# Patient Record
Sex: FEMALE | Race: WHITE | NOT HISPANIC OR LATINO | Employment: FULL TIME | URBAN - METROPOLITAN AREA
[De-identification: names, ages, dates, MRNs, and addresses within clinical notes are randomized per-mention and may not be internally consistent; named-entity substitution may affect disease eponyms.]

---

## 2020-11-03 ENCOUNTER — APPOINTMENT (EMERGENCY)
Dept: RADIOLOGY | Facility: HOSPITAL | Age: 30
End: 2020-11-03
Payer: COMMERCIAL

## 2020-11-03 ENCOUNTER — HOSPITAL ENCOUNTER (EMERGENCY)
Facility: HOSPITAL | Age: 30
Discharge: HOME | End: 2020-11-03
Attending: FAMILY MEDICINE
Payer: COMMERCIAL

## 2020-11-03 VITALS
BODY MASS INDEX: 39.24 KG/M2 | HEART RATE: 85 BPM | HEIGHT: 67 IN | RESPIRATION RATE: 20 BRPM | WEIGHT: 250 LBS | TEMPERATURE: 97.9 F | SYSTOLIC BLOOD PRESSURE: 115 MMHG | DIASTOLIC BLOOD PRESSURE: 61 MMHG | OXYGEN SATURATION: 97 %

## 2020-11-03 DIAGNOSIS — R07.89 ATYPICAL CHEST PAIN: Primary | ICD-10-CM

## 2020-11-03 DIAGNOSIS — F41.9 ANXIETY: ICD-10-CM

## 2020-11-03 LAB
ANION GAP SERPL CALCULATED.3IONS-SCNC: 8 MMOL/L (ref 4–13)
BASOPHILS # BLD AUTO: 0.1 THOUSANDS/ΜL (ref 0–0.1)
BASOPHILS NFR BLD AUTO: 1 % (ref 0–2)
BNP SERPL-MCNC: 10 PG/ML (ref 1–100)
BUN SERPL-MCNC: 16 MG/DL (ref 7–25)
CALCIUM SERPL-MCNC: 10 MG/DL (ref 8.6–10.5)
CHLORIDE SERPL-SCNC: 104 MMOL/L (ref 98–107)
CO2 SERPL-SCNC: 25 MMOL/L (ref 21–31)
CREAT SERPL-MCNC: 0.83 MG/DL (ref 0.6–1.2)
EOSINOPHIL # BLD AUTO: 0.2 THOUSAND/ΜL (ref 0–0.61)
EOSINOPHIL NFR BLD AUTO: 2 % (ref 0–5)
ERYTHROCYTE [DISTWIDTH] IN BLOOD BY AUTOMATED COUNT: 13 % (ref 11.5–14.5)
GFR SERPL CREATININE-BSD FRML MDRD: 96 ML/MIN/1.73SQ M
GLUCOSE SERPL-MCNC: 114 MG/DL (ref 65–99)
HCT VFR BLD AUTO: 45 % (ref 42–47)
HGB BLD-MCNC: 15.4 G/DL (ref 12–16)
LYMPHOCYTES # BLD AUTO: 2.7 THOUSANDS/ΜL (ref 0.6–4.47)
LYMPHOCYTES NFR BLD AUTO: 36 % (ref 21–51)
MCH RBC QN AUTO: 30.1 PG (ref 26–34)
MCHC RBC AUTO-ENTMCNC: 34.2 G/DL (ref 31–37)
MCV RBC AUTO: 88 FL (ref 81–99)
MONOCYTES # BLD AUTO: 0.6 THOUSAND/ΜL (ref 0.17–1.22)
MONOCYTES NFR BLD AUTO: 8 % (ref 2–12)
NEUTROPHILS # BLD AUTO: 4 THOUSANDS/ΜL (ref 1.4–6.5)
NEUTS SEG NFR BLD AUTO: 53 % (ref 42–75)
PLATELET # BLD AUTO: 343 THOUSANDS/UL (ref 149–390)
PMV BLD AUTO: 8.7 FL (ref 8.6–11.7)
POTASSIUM SERPL-SCNC: 4 MMOL/L (ref 3.5–5.5)
RBC # BLD AUTO: 5.11 MILLION/UL (ref 3.9–5.2)
SODIUM SERPL-SCNC: 137 MMOL/L (ref 134–143)
TROPONIN I SERPL-MCNC: <0.03 NG/ML
WBC # BLD AUTO: 7.4 THOUSAND/UL (ref 4.8–10.8)

## 2020-11-03 PROCEDURE — 99285 EMERGENCY DEPT VISIT HI MDM: CPT

## 2020-11-03 PROCEDURE — 84484 ASSAY OF TROPONIN QUANT: CPT | Performed by: FAMILY MEDICINE

## 2020-11-03 PROCEDURE — 85025 COMPLETE CBC W/AUTO DIFF WBC: CPT | Performed by: FAMILY MEDICINE

## 2020-11-03 PROCEDURE — 71045 X-RAY EXAM CHEST 1 VIEW: CPT

## 2020-11-03 PROCEDURE — 36415 COLL VENOUS BLD VENIPUNCTURE: CPT | Performed by: FAMILY MEDICINE

## 2020-11-03 PROCEDURE — 80048 BASIC METABOLIC PNL TOTAL CA: CPT | Performed by: FAMILY MEDICINE

## 2020-11-03 PROCEDURE — 99284 EMERGENCY DEPT VISIT MOD MDM: CPT | Performed by: FAMILY MEDICINE

## 2020-11-03 PROCEDURE — 83880 ASSAY OF NATRIURETIC PEPTIDE: CPT | Performed by: FAMILY MEDICINE

## 2020-11-03 PROCEDURE — 93005 ELECTROCARDIOGRAM TRACING: CPT

## 2020-11-03 RX ORDER — IBUPROFEN 200 MG
400 TABLET ORAL EVERY 6 HOURS PRN
COMMUNITY

## 2020-11-03 RX ORDER — LORAZEPAM 1 MG/1
1 TABLET ORAL ONCE
Status: COMPLETED | OUTPATIENT
Start: 2020-11-03 | End: 2020-11-03

## 2020-11-03 RX ORDER — BUSPIRONE HYDROCHLORIDE 15 MG/1
15 TABLET ORAL 3 TIMES DAILY
Qty: 60 TABLET | Refills: 0 | Status: SHIPPED | OUTPATIENT
Start: 2020-11-03 | End: 2020-11-23

## 2020-11-03 RX ADMIN — LORAZEPAM 1 MG: 1 TABLET ORAL at 10:45

## 2020-11-04 LAB
ATRIAL RATE: 97 BPM
P AXIS: 69 DEGREES
PR INTERVAL: 150 MS
QRS AXIS: 79 DEGREES
QRSD INTERVAL: 80 MS
QT INTERVAL: 358 MS
QTC INTERVAL: 454 MS
T WAVE AXIS: 34 DEGREES
VENTRICULAR RATE: 97 BPM

## 2020-11-04 PROCEDURE — 93010 ELECTROCARDIOGRAM REPORT: CPT | Performed by: INTERNAL MEDICINE

## 2021-03-30 DIAGNOSIS — Z23 ENCOUNTER FOR IMMUNIZATION: ICD-10-CM

## 2022-04-15 ENCOUNTER — OFFICE VISIT (OUTPATIENT)
Dept: URGENT CARE | Facility: CLINIC | Age: 32
End: 2022-04-15
Payer: COMMERCIAL

## 2022-04-15 VITALS
OXYGEN SATURATION: 98 % | HEIGHT: 67 IN | HEART RATE: 110 BPM | TEMPERATURE: 97.3 F | WEIGHT: 265 LBS | RESPIRATION RATE: 18 BRPM | BODY MASS INDEX: 41.59 KG/M2

## 2022-04-15 DIAGNOSIS — R05.1 ACUTE COUGH: Primary | ICD-10-CM

## 2022-04-15 PROCEDURE — 87636 SARSCOV2 & INF A&B AMP PRB: CPT | Performed by: NURSE PRACTITIONER

## 2022-04-15 PROCEDURE — G0382 LEV 3 HOSP TYPE B ED VISIT: HCPCS | Performed by: NURSE PRACTITIONER

## 2022-04-15 RX ORDER — BROMPHENIRAMINE MALEATE, PSEUDOEPHEDRINE HYDROCHLORIDE, AND DEXTROMETHORPHAN HYDROBROMIDE 2; 30; 10 MG/5ML; MG/5ML; MG/5ML
5 SYRUP ORAL 4 TIMES DAILY PRN
Qty: 120 ML | Refills: 0 | Status: SHIPPED | OUTPATIENT
Start: 2022-04-15

## 2022-04-15 RX ORDER — ALBUTEROL SULFATE 90 UG/1
2 AEROSOL, METERED RESPIRATORY (INHALATION) EVERY 6 HOURS PRN
Qty: 6.7 G | Refills: 0 | Status: SHIPPED | OUTPATIENT
Start: 2022-04-15

## 2022-04-15 NOTE — PATIENT INSTRUCTIONS
Take medication as directed  Rest and drink plenty of fluids  A cool mist humidifier can be helpful  If you develop a worsening cough, chest pain, shortness of breath, palpitations, coughing up blood, prolonged high fever, any new or concerning symptoms please return or proceed ER  Recommend following up with PCP in 3-5 days  Acute Cough   WHAT YOU NEED TO KNOW:   An acute cough can last up to 3 weeks  Common causes of an acute cough include a cold, allergies, or a lung infection  DISCHARGE INSTRUCTIONS:   Return to the emergency department if:   · You have trouble breathing or feel short of breath  · You cough up blood, or you see blood in your mucus  · You faint or feel weak or dizzy  · You have chest pain when you cough or take a deep breath  · You have new wheezing  Contact your healthcare provider if:   · You have a fever  · Your cough lasts longer than 4 weeks  · Your symptoms do not improve with treatment  · You have questions or concerns about your condition or care  Medicines:   · Medicines  may be needed to stop the cough, decrease swelling in your airways, or help open your airways  Medicine may also be given to help you cough up mucus  Ask your healthcare provider what over-the-counter medicines you can take  If you have an infection caused by bacteria, you may need antibiotics  · Take your medicine as directed  Contact your healthcare provider if you think your medicine is not helping or if you have side effects  Tell him or her if you are allergic to any medicine  Keep a list of the medicines, vitamins, and herbs you take  Include the amounts, and when and why you take them  Bring the list or the pill bottles to follow-up visits  Carry your medicine list with you in case of an emergency  Manage your symptoms:   · Do not smoke and stay away from others who smoke    Nicotine and other chemicals in cigarettes and cigars can cause lung damage and make your cough worse  Ask your healthcare provider for information if you currently smoke and need help to quit  E-cigarettes or smokeless tobacco still contain nicotine  Talk to your healthcare provider before you use these products  · Drink extra liquids as directed  Liquids will help thin and loosen mucus so you can cough it up  Liquids will also help prevent dehydration  Examples of good liquids to drink include water, fruit juice, and broth  Do not drink liquids that contain caffeine  Caffeine can increase your risk for dehydration  Ask your healthcare provider how much liquid to drink each day  · Rest as directed  Do not do activities that make your cough worse, such as exercise  · Use a humidifier or vaporizer  Use a cool mist humidifier or a vaporizer to increase air moisture in your home  This may make it easier for you to breathe and help decrease your cough  · Eat 2 to 5 mL of honey 2 times each day  Honey can help thin mucus and decrease your cough  · Use cough drops or lozenges  These can help decrease throat irritation and your cough  Follow up with your healthcare provider as directed:  Write down your questions so you remember to ask them during your visits  © Copyright [employer] 2022 Information is for End User's use only and may not be sold, redistributed or otherwise used for commercial purposes  All illustrations and images included in CareNotes® are the copyrighted property of A D A M , Inc  or [facility] Alysha Kaur   The above information is an  only  It is not intended as medical advice for individual conditions or treatments  Talk to your doctor, nurse or pharmacist before following any medical regimen to see if it is safe and effective for you

## 2022-04-15 NOTE — PROGRESS NOTES
[facility]        NAME: Irma Nobles is a 28 y o  female  : 1990    MRN: [id number]  DATE: April 15, 2022  TIME: 6:24 PM    Assessment and Plan   Acute cough [R05 1]  1  Acute cough  Covid/Flu-Office Collect    albuterol (Proventil HFA) 90 mcg/act inhaler    brompheniramine-pseudoephedrine-DM 30-2-10 MG/5ML syrup         Patient Instructions     Patient Instructions   Take medication as directed  Rest and drink plenty of fluids  A cool mist humidifier can be helpful  If you develop a worsening cough, chest pain, shortness of breath, palpitations, coughing up blood, prolonged high fever, any new or concerning symptoms please return or proceed ER  Recommend following up with PCP in 3-5 days  Acute Cough   WHAT YOU NEED TO KNOW:   An acute cough can last up to 3 weeks  Common causes of an acute cough include a cold, allergies, or a lung infection  DISCHARGE INSTRUCTIONS:   Return to the emergency department if:   · You have trouble breathing or feel short of breath  · You cough up blood, or you see blood in your mucus  · You faint or feel weak or dizzy  · You have chest pain when you cough or take a deep breath  · You have new wheezing  Contact your healthcare provider if:   · You have a fever  · Your cough lasts longer than 4 weeks  · Your symptoms do not improve with treatment  · You have questions or concerns about your condition or care  Medicines:   · Medicines  may be needed to stop the cough, decrease swelling in your airways, or help open your airways  Medicine may also be given to help you cough up mucus  Ask your healthcare provider what over-the-counter medicines you can take  If you have an infection caused by bacteria, you may need antibiotics  · Take your medicine as directed  Contact your healthcare provider if you think your medicine is not helping or if you have side effects  Tell him or her if you are allergic to any medicine   Keep a list of the medicines, vitamins, and herbs you take  Include the amounts, and when and why you take them  Bring the list or the pill bottles to follow-up visits  Carry your medicine list with you in case of an emergency  Manage your symptoms:   · Do not smoke and stay away from others who smoke  Nicotine and other chemicals in cigarettes and cigars can cause lung damage and make your cough worse  Ask your healthcare provider for information if you currently smoke and need help to quit  E-cigarettes or smokeless tobacco still contain nicotine  Talk to your healthcare provider before you use these products  · Drink extra liquids as directed  Liquids will help thin and loosen mucus so you can cough it up  Liquids will also help prevent dehydration  Examples of good liquids to drink include water, fruit juice, and broth  Do not drink liquids that contain caffeine  Caffeine can increase your risk for dehydration  Ask your healthcare provider how much liquid to drink each day  · Rest as directed  Do not do activities that make your cough worse, such as exercise  · Use a humidifier or vaporizer  Use a cool mist humidifier or a vaporizer to increase air moisture in your home  This may make it easier for you to breathe and help decrease your cough  · Eat 2 to 5 mL of honey 2 times each day  Honey can help thin mucus and decrease your cough  · Use cough drops or lozenges  These can help decrease throat irritation and your cough  Follow up with your healthcare provider as directed:  Write down your questions so you remember to ask them during your visits  © Copyright [employer] 2022 Information is for End User's use only and may not be sold, redistributed or otherwise used for commercial purposes  All illustrations and images included in CareNotes® are the copyrighted property of [employer]  or Ricki Kaur   The above information is an  only   It is not intended as medical advice for individual conditions or treatments  Talk to your doctor, nurse or pharmacist before following any medical regimen to see if it is safe and effective for you  Follow up with PCP in 3-5 days  Proceed to  ER if symptoms worsen  Chief Complaint     Chief Complaint   Patient presents with    Cough     Cough, SOB, congestion, fever for 1 day also hurts to take a deep breath, started 2 days ago         History of Present Illness       Cough  This is a new problem  The current episode started yesterday  The problem has been unchanged  The problem occurs every few minutes  The cough is non-productive  Associated symptoms include a fever, nasal congestion, rhinorrhea and shortness of breath  Pertinent negatives include no chest pain, chills, ear congestion, ear pain, headaches, heartburn, hemoptysis, myalgias, postnasal drip, rash, sore throat, sweats, weight loss or wheezing  Nothing aggravates the symptoms  Risk factors: denies any recent sick contacts or known exposure to covid 19  Treatments tried: dayquil and nyquil  The treatment provided mild relief  Her past medical history is significant for asthma and bronchitis  There is no history of COPD  Review of Systems   Review of Systems   Constitutional: Positive for fever  Negative for chills, diaphoresis, fatigue and weight loss  HENT: Positive for congestion and rhinorrhea  Negative for ear pain, facial swelling, postnasal drip, sinus pressure, sinus pain, sore throat, tinnitus and trouble swallowing  Eyes: Negative  Respiratory: Positive for cough and shortness of breath  Negative for hemoptysis, chest tightness, wheezing and stridor  Cardiovascular: Negative for chest pain and palpitations  Gastrointestinal: Negative  Negative for heartburn  Musculoskeletal: Negative for arthralgias, back pain, joint swelling, myalgias, neck pain and neck stiffness  Skin: Negative for rash     Neurological: Negative for dizziness, facial asymmetry, weakness, light-headedness, numbness and headaches  Current Medications       Current Outpatient Medications:     ibuprofen (MOTRIN) 200 mg tablet, Take 400 mg by mouth every 6 (six) hours as needed for mild pain, Disp: , Rfl:     albuterol (Proventil HFA) 90 mcg/act inhaler, Inhale 2 puffs every 6 (six) hours as needed for wheezing, Disp: 6 7 g, Rfl: 0    brompheniramine-pseudoephedrine-DM 30-2-10 MG/5ML syrup, Take 5 mL by mouth 4 (four) times a day as needed for allergies, Disp: 120 mL, Rfl: 0    busPIRone (BUSPAR) 15 mg tablet, Take 1 tablet (15 mg total) by mouth 3 (three) times a day for 20 days, Disp: 60 tablet, Rfl: 0    Current Allergies     Allergies as of 04/15/2022 - Reviewed 04/15/2022   Allergen Reaction Noted    Peanut-containing drug products - food allergy Anaphylaxis 11/03/2020            The following portions of the patient's history were reviewed and updated as appropriate: allergies, current medications, past family history, past medical history, past social history, past surgical history and problem list      Past Medical History:   Diagnosis Date    Allergy-induced asthma     Seasonal allergies        Past Surgical History:   Procedure Laterality Date    FOOT SURGERY Right     hardware inserted and removed after her body rejected it    TONSILLECTOMY AND ADENOIDECTOMY         Family History   Problem Relation Age of Onset    Cancer Father          Medications have been verified  Objective   Pulse (!) 110   Temp (!) 97 3 °F (36 3 °C)   Resp 18   Ht 5' 7" (1 702 m)   Wt 120 kg (265 lb)   SpO2 98%   BMI 41 50 kg/m²   No LMP recorded  Physical Exam     Physical Exam  Constitutional:       General: She is not in acute distress  Appearance: She is well-developed  She is not diaphoretic  HENT:      Head: Normocephalic and atraumatic        Right Ear: Hearing, tympanic membrane, ear canal and external ear normal       Left Ear: Hearing, tympanic membrane, ear canal and external ear normal       Nose: Congestion and rhinorrhea present  No mucosal edema  Right Sinus: No maxillary sinus tenderness or frontal sinus tenderness  Left Sinus: No maxillary sinus tenderness or frontal sinus tenderness  Mouth/Throat:      Mouth: Mucous membranes are moist       Pharynx: Oropharynx is clear  Uvula midline  Cardiovascular:      Rate and Rhythm: Normal rate and regular rhythm  Heart sounds: Normal heart sounds, S1 normal and S2 normal    Pulmonary:      Effort: Pulmonary effort is normal       Breath sounds: Normal air entry  Examination of the right-lower field reveals rhonchi  Examination of the left-lower field reveals rhonchi  Rhonchi present  Lymphadenopathy:      Cervical: No cervical adenopathy  Skin:     General: Skin is warm and dry  Capillary Refill: Capillary refill takes less than 2 seconds  Neurological:      Mental Status: She is alert and oriented to person, place, and time

## 2022-04-16 LAB
FLUAV RNA RESP QL NAA+PROBE: NEGATIVE
FLUBV RNA RESP QL NAA+PROBE: NEGATIVE
SARS-COV-2 RNA RESP QL NAA+PROBE: NEGATIVE

## 2022-04-18 ENCOUNTER — OFFICE VISIT (OUTPATIENT)
Dept: URGENT CARE | Facility: CLINIC | Age: 32
End: 2022-04-18
Payer: COMMERCIAL

## 2022-04-18 VITALS
WEIGHT: 265 LBS | HEIGHT: 67 IN | DIASTOLIC BLOOD PRESSURE: 74 MMHG | BODY MASS INDEX: 41.59 KG/M2 | HEART RATE: 117 BPM | TEMPERATURE: 98 F | SYSTOLIC BLOOD PRESSURE: 122 MMHG | RESPIRATION RATE: 18 BRPM | OXYGEN SATURATION: 97 %

## 2022-04-18 DIAGNOSIS — R05.9 COUGH: Primary | ICD-10-CM

## 2022-04-18 DIAGNOSIS — J04.0 ACUTE LARYNGITIS: ICD-10-CM

## 2022-04-18 PROCEDURE — G0382 LEV 3 HOSP TYPE B ED VISIT: HCPCS

## 2022-04-18 RX ORDER — BENZONATATE 100 MG/1
100 CAPSULE ORAL 3 TIMES DAILY PRN
Qty: 20 CAPSULE | Refills: 0 | Status: SHIPPED | OUTPATIENT
Start: 2022-04-18

## 2022-04-18 NOTE — PATIENT INSTRUCTIONS
Recommend OTC Delsym for your cough  Can try tessalon pereles as needed  Cool mist humidifier  Drink plenty of fluids  If you develop any new or worsening symptoms, return or follow up with PCP  Laryngitis   AMBULATORY CARE:   Laryngitis  is inflammation of your larynx (voice box)  The larynx holds your vocal cords  Your vocal cords usually open and close easily to form sounds  With laryngitis, your vocal cords swell and become irritated  This may change how your voice sounds, or you may lose your voice for a short while  Common signs and symptoms:   · A weak voice or loss of voice    · Hoarse, raspy voice    · Sore, dry, raw throat    · Clearing your throat often    · Dry cough    Call your local emergency number (911 in the [address] [facility]) if:   · You have sudden trouble breathing  · You have severe drooling or trouble swallowing  Seek care immediately if:   · You cough up blood  · You have severe pain  Call your doctor if:   · Your symptoms last longer than 2 weeks  · You have questions or concerns about your condition or care  Treatment for laryngitis  is usually not needed  Laryngitis typically gets better on its own within 1 to 2 weeks  Medicines such as steroids or antibiotics may be used in some cases  Self-care:   · Rest your voice  Try to talk as little as possible  · Use a cool mist humidifier  to increase air moisture in your home  This may soothe your throat and decrease your cough  · Keep your mouth and throat moist   Suck on a throat lozenge or chew sugarless gum  · Do not smoke, and avoid secondhand smoke  Nicotine and other chemicals in cigarettes and cigars can cause dryness and irritation in your throat and vocal cords  Ask your healthcare provider for information if you currently smoke and need help to quit  E-cigarettes or smokeless tobacco still contain nicotine  Talk to your healthcare provider before you use these products  · Drink liquids as directed    You may need to drink extra liquids to help soothe your throat  Water or warm tea are good liquids to drink  · Avoid spicy and acidic foods  These may irritate your throat  Examples include citrus, salad dressings, and hot sauces  Carbonated drinks may also cause discomfort in your throat  · Try not to clear your throat  This can cause more irritation and swelling of your vocal cords  Follow up with your doctor or ear, nose, and throat specialist as directed:  Write down your questions so you remember to ask them during your visits  © Copyright [employer] 2022 Information is for End User's use only and may not be sold, redistributed or otherwise used for commercial purposes  All illustrations and images included in CareNotes® are the copyrighted property of [employer]  or Ricki Tijerina  The above information is an  only  It is not intended as medical advice for individual conditions or treatments  Talk to your doctor, nurse or pharmacist before following any medical regimen to see if it is safe and effective for you

## 2022-04-18 NOTE — PROGRESS NOTES
[facility]        NAME: Linda Lilly is a 28 y o  female  : 1990    MRN: [id number]  DATE: 2022  TIME: 11:23 AM    Assessment and Plan   Cough [R05 9]  1  Cough  Ambulatory Referral to Family Practice    benzonatate (TESSALON PERLES) 100 mg capsule   2  Acute laryngitis           Patient Instructions     Patient Instructions   Recommend OTC Delsym for your cough  Can try tessalon pereles as needed  Cool mist humidifier  Drink plenty of fluids  If you develop any new or worsening symptoms, return or follow up with PCP  Laryngitis   AMBULATORY CARE:   Laryngitis  is inflammation of your larynx (voice box)  The larynx holds your vocal cords  Your vocal cords usually open and close easily to form sounds  With laryngitis, your vocal cords swell and become irritated  This may change how your voice sounds, or you may lose your voice for a short while  Common signs and symptoms:   · A weak voice or loss of voice    · Hoarse, raspy voice    · Sore, dry, raw throat    · Clearing your throat often    · Dry cough    Call your local emergency number (911 in the [address] [place] [address],[address]) if:   · You have sudden trouble breathing  · You have severe drooling or trouble swallowing  Seek care immediately if:   · You cough up blood  · You have severe pain  Call your doctor if:   · Your symptoms last longer than 2 weeks  · You have questions or concerns about your condition or care  Treatment for laryngitis  is usually not needed  Laryngitis typically gets better on its own within 1 to 2 weeks  Medicines such as steroids or antibiotics may be used in some cases  Self-care:   · Rest your voice  Try to talk as little as possible  · Use a cool mist humidifier  to increase air moisture in your home  This may soothe your throat and decrease your cough  · Keep your mouth and throat moist   Suck on a throat lozenge or chew sugarless gum  · Do not smoke, and avoid secondhand smoke    Nicotine and other chemicals in cigarettes and cigars can cause dryness and irritation in your throat and vocal cords  Ask your healthcare provider for information if you currently smoke and need help to quit  E-cigarettes or smokeless tobacco still contain nicotine  Talk to your healthcare provider before you use these products  · Drink liquids as directed  You may need to drink extra liquids to help soothe your throat  Water or warm tea are good liquids to drink  · Avoid spicy and acidic foods  These may irritate your throat  Examples include citrus, salad dressings, and hot sauces  Carbonated drinks may also cause discomfort in your throat  · Try not to clear your throat  This can cause more irritation and swelling of your vocal cords  Follow up with your doctor or ear, nose, and throat specialist as directed:  Write down your questions so you remember to ask them during your visits  © Copyright [employer] 2022 Information is for End User's use only and may not be sold, redistributed or otherwise used for commercial purposes  All illustrations and images included in CareNotes® are the copyrighted property of A D A M , Inc  or [address] [place], [place] [address]carmela Kaur   The above information is an  only  It is not intended as medical advice for individual conditions or treatments  Talk to your doctor, nurse or pharmacist before following any medical regimen to see if it is safe and effective for you  Follow up with PCP in 3-5 days  Proceed to  ER if symptoms worsen  Chief Complaint     Chief Complaint   Patient presents with    Cough     Pt c/o cough, fever, and sore throat that started 5 days  History of Present Illness       HPI   Simeon Porras is a 28 y o  female who presents today for evaluation of a frequent dry cough, occasional SOB with activity, wheezing, and sore throat  [facility] days ago with similar symptoms and had fevers  Was swabbed and negative for Flu and Covid   She is no longer having fevers  Now has a loss of voice  Is having trouble sleeping due to the coughing  She is taking Bromphed, Nyquil, dayquil, and albuterol inhaler as needed without significant relief  She denies chest pain, N/V/D  Review of Systems   Review of Systems   Constitutional: Positive for fatigue  Negative for chills and fever  HENT: Positive for sore throat  Negative for congestion, rhinorrhea, sinus pressure and sinus pain  Respiratory: Positive for cough, shortness of breath and wheezing  Cardiovascular: Negative for chest pain and palpitations  Gastrointestinal: Negative for abdominal pain, diarrhea, nausea and vomiting  Genitourinary: Negative for decreased urine volume  Musculoskeletal: Positive for arthralgias and myalgias  Skin: Negative for color change and rash  Neurological: Negative for dizziness, weakness and headaches           Current Medications       Current Outpatient Medications:     albuterol (Proventil HFA) 90 mcg/act inhaler, Inhale 2 puffs every 6 (six) hours as needed for wheezing, Disp: 6 7 g, Rfl: 0    brompheniramine-pseudoephedrine-DM 30-2-10 MG/5ML syrup, Take 5 mL by mouth 4 (four) times a day as needed for allergies, Disp: 120 mL, Rfl: 0    ibuprofen (MOTRIN) 200 mg tablet, Take 400 mg by mouth every 6 (six) hours as needed for mild pain, Disp: , Rfl:     benzonatate (TESSALON PERLES) 100 mg capsule, Take 1 capsule (100 mg total) by mouth 3 (three) times a day as needed for cough, Disp: 20 capsule, Rfl: 0    busPIRone (BUSPAR) 15 mg tablet, Take 1 tablet (15 mg total) by mouth 3 (three) times a day for 20 days, Disp: 60 tablet, Rfl: 0    Current Allergies     Allergies as of 04/18/2022 - Reviewed 04/18/2022   Allergen Reaction Noted    Peanut-containing drug products - food allergy Anaphylaxis 11/03/2020            The following portions of the patient's history were reviewed and updated as appropriate: allergies, current medications, past family history, past medical history, past social history, past surgical history and problem list      Past Medical History:   Diagnosis Date    Allergy-induced asthma     Seasonal allergies        Past Surgical History:   Procedure Laterality Date    FOOT SURGERY Right     hardware inserted and removed after her body rejected it    TONSILLECTOMY AND ADENOIDECTOMY         Family History   Problem Relation Age of Onset    Cancer Father          Medications have been verified  Objective   /74   Pulse (!) 117   Temp 98 °F (36 7 °C)   Resp 18   Ht 5' 7" (1 702 m)   Wt 120 kg (265 lb)   LMP 04/18/2022   SpO2 97%   BMI 41 50 kg/m²        Physical Exam     Physical Exam  Vitals and nursing note reviewed  Constitutional:       General: She is not in acute distress  Appearance: Normal appearance  HENT:      Head: Normocephalic and atraumatic  Right Ear: Tympanic membrane and ear canal normal       Left Ear: Tympanic membrane and ear canal normal       Nose: No congestion or rhinorrhea  Mouth/Throat:      Mouth: Mucous membranes are moist       Pharynx: Uvula midline  No oropharyngeal exudate or posterior oropharyngeal erythema  Comments: H/o tonsillectomy  Cardiovascular:      Rate and Rhythm: Normal rate and regular rhythm  Heart sounds: Normal heart sounds  Pulmonary:      Effort: Pulmonary effort is normal       Breath sounds: Normal breath sounds  No wheezing, rhonchi or rales  Lymphadenopathy:      Cervical: No cervical adenopathy  Psychiatric:         Behavior: Behavior normal  Behavior is cooperative

## 2022-04-28 ENCOUNTER — OFFICE VISIT (OUTPATIENT)
Dept: URGENT CARE | Age: 32
End: 2022-04-28
Payer: COMMERCIAL

## 2022-04-28 VITALS — HEART RATE: 117 BPM | TEMPERATURE: 98.2 F | RESPIRATION RATE: 22 BRPM | OXYGEN SATURATION: 98 %

## 2022-04-28 DIAGNOSIS — B37.0 ORAL CANDIDIASIS: Primary | ICD-10-CM

## 2022-04-28 DIAGNOSIS — R05.1 ACUTE COUGH: ICD-10-CM

## 2022-04-28 PROCEDURE — G0382 LEV 3 HOSP TYPE B ED VISIT: HCPCS

## 2022-04-28 RX ORDER — METHYLPREDNISOLONE 4 MG/1
TABLET ORAL
Qty: 21 TABLET | Refills: 0 | Status: SHIPPED | OUTPATIENT
Start: 2022-04-28 | End: 2022-05-04

## 2022-04-28 NOTE — PROGRESS NOTES
[address] [facility]        NAME: Alhaji Matias is a 28 y o  female  : 1990    MRN: [id number]  DATE: 2022  TIME: 4:20 PM    Assessment and Plan   Oral candidiasis [B37 0]  1  Oral candidiasis  nystatin (MYCOSTATIN) 500,000 units/5 mL suspension   2  Acute cough  methylPREDNISolone 4 MG tablet therapy pack         Patient Instructions     Steroids as discussed for cough  Nystatin for oral candidiasis  Follow up with PCP in 3-5 days  Proceed to  ER if symptoms worsen  Chief Complaint     Chief Complaint   Patient presents with    Cough     yellow on tongue, bad breath, and bad taste in mouth         History of Present Illness       Patient presenting for evaluation of a productive cough as well as a yellow/white coating on her tongue  Patient states that she developed a cough nearly 2 weeks ago  She states that she was seen in urgent care 2 times  She states she was treated with liquid cough medicine, albuterol inhaler and Tessalon, which have not helped the cough  She states she was also tested COVID, which was negative  Patient states over the past day she noticed a bad taste in her mouth as well as yellow/white coating on her tongue  She denies any recent use of antibiotics  She denies any current treatment for the symptoms  Denies any chest pain, shortness a breath, fevers or chills  Review of Systems   Review of Systems   Constitutional: Negative for chills and fever  HENT: Negative for ear pain and sore throat  Tongue coating   Eyes: Negative for pain and visual disturbance  Respiratory: Positive for cough  Negative for shortness of breath  Cardiovascular: Negative for chest pain and palpitations  Gastrointestinal: Negative for abdominal pain and vomiting  Genitourinary: Negative for dysuria and hematuria  Musculoskeletal: Negative for arthralgias and back pain  Skin: Negative for color change and rash     Neurological: Negative for seizures and syncope  All other systems reviewed and are negative  Current Medications       Current Outpatient Medications:     albuterol (Proventil HFA) 90 mcg/act inhaler, Inhale 2 puffs every 6 (six) hours as needed for wheezing, Disp: 6 7 g, Rfl: 0    benzonatate (TESSALON PERLES) 100 mg capsule, Take 1 capsule (100 mg total) by mouth 3 (three) times a day as needed for cough, Disp: 20 capsule, Rfl: 0    brompheniramine-pseudoephedrine-DM 30-2-10 MG/5ML syrup, Take 5 mL by mouth 4 (four) times a day as needed for allergies, Disp: 120 mL, Rfl: 0    busPIRone (BUSPAR) 15 mg tablet, Take 1 tablet (15 mg total) by mouth 3 (three) times a day for 20 days, Disp: 60 tablet, Rfl: 0    ibuprofen (MOTRIN) 200 mg tablet, Take 400 mg by mouth every 6 (six) hours as needed for mild pain, Disp: , Rfl:     methylPREDNISolone 4 MG tablet therapy pack, Take 6 tablets (24 mg total) by mouth daily for 1 day, THEN 5 tablets (20 mg total) daily for 1 day, THEN 4 tablets (16 mg total) daily for 1 day, THEN 3 tablets (12 mg total) daily for 1 day, THEN 2 tablets (8 mg total) daily for 1 day, THEN 1 tablet (4 mg total) daily for 1 day  Use as directed on package  , Disp: 21 tablet, Rfl: 0    nystatin (MYCOSTATIN) 500,000 units/5 mL suspension, Apply 5 mL (500,000 Units total) to the mouth or throat 4 (four) times a day for 7 days, Disp: 140 mL, Rfl: 0    Current Allergies     Allergies as of 04/28/2022 - Reviewed 04/28/2022   Allergen Reaction Noted    Peanut-containing drug products - food allergy Anaphylaxis 11/03/2020            The following portions of the patient's history were reviewed and updated as appropriate: allergies, current medications, past family history, past medical history, past social history, past surgical history and problem list      Past Medical History:   Diagnosis Date    Allergy-induced asthma     Seasonal allergies        Past Surgical History:   Procedure Laterality Date    FOOT SURGERY Right hardware inserted and removed after her body rejected it    TONSILLECTOMY AND ADENOIDECTOMY         Family History   Problem Relation Age of Onset    Cancer Father          Medications have been verified  Objective   Pulse (!) 117   Temp 98 2 °F (36 8 °C) (Temporal)   Resp 22   LMP 04/18/2022   SpO2 98%        Physical Exam     Physical Exam  Vitals and nursing note reviewed  Constitutional:       General: She is not in acute distress  Appearance: Normal appearance  She is obese  She is not ill-appearing  HENT:      Head: Normocephalic and atraumatic  Right Ear: Tympanic membrane normal       Left Ear: Tympanic membrane normal       Nose: Congestion present  No rhinorrhea  Mouth/Throat:      Lips: Pink  Mouth: Mucous membranes are moist       Tongue: Lesions present  Tongue does not deviate from midline  Palate: No mass and lesions  Pharynx: Oropharynx is clear  Uvula midline  No pharyngeal swelling, oropharyngeal exudate, posterior oropharyngeal erythema or uvula swelling  Tonsils: No tonsillar exudate or tonsillar abscesses  0 on the right  0 on the left  Eyes:      Extraocular Movements: Extraocular movements intact  Conjunctiva/sclera: Conjunctivae normal       Pupils: Pupils are equal, round, and reactive to light  Cardiovascular:      Rate and Rhythm: Normal rate and regular rhythm  Pulses: Normal pulses  Heart sounds: Normal heart sounds  No murmur heard  No friction rub  No gallop  Pulmonary:      Effort: Pulmonary effort is normal  No respiratory distress  Breath sounds: Normal breath sounds  No stridor  No wheezing, rhonchi or rales  Chest:      Chest wall: No tenderness  Abdominal:      General: Abdomen is flat  Bowel sounds are normal       Palpations: Abdomen is soft  Tenderness: There is no abdominal tenderness  Musculoskeletal:         General: No tenderness  Normal range of motion        Cervical back: Normal range of motion and neck supple  Skin:     General: Skin is warm and dry  Capillary Refill: Capillary refill takes less than 2 seconds  Neurological:      General: No focal deficit present  Mental Status: She is alert and oriented to person, place, and time     Psychiatric:         Mood and Affect: Mood normal          Behavior: Behavior normal

## 2022-04-28 NOTE — PATIENT INSTRUCTIONS
Oral Candidiasis   AMBULATORY CARE:   Oral candidiasis , or thrush, is a fungal infection that affects the inside of your mouth  Seek care immediately if:   · You have trouble swallowing and your jaw and neck are stiff  · You are dizzy, thirsty, or have a dry mouth  · You are urinating little or not at all  · You cannot eat or drink because of the pain  Contact your healthcare provider if:   · You have a fever  · You have nausea, vomiting, or diarrhea  · Your signs and symptoms get worse, even after treatment  · You have questions or concerns about your condition or care  Common symptoms include the following:   · White or whitish-yellow patches in the mouth that look like milk curds    · Redness or bleeding under the patches    · Sore and painful mouth, with cracking or tearing on the corners    · Bright red tongue that may feel like it is burning    · Trouble swallowing and tasting    · Swelling under dentures    Treatment for oral candidiasis  includes antifungal medicine that helps kill the fungus that caused your oral candidiasis  This medicine may be a pill or a solution that you gargle  Remove dentures before you gargle  Prevent oral candidiasis:  Brush your teeth, gums, and tongue after you eat and before you go to sleep  Use a toothbrush with soft bristles  See your dentist for regular exams  Remove your dentures when you sleep, or at least 6 hours each day  Clean your dentures and soak them in denture   Let them air dry after soaking  Follow up with your doctor as directed:  Write down your questions so you remember to ask them during your visits  © Copyright [employer] 2022 Information is for End User's use only and may not be sold, redistributed or otherwise used for commercial purposes  All illustrations and images included in CareNotes® are the copyrighted property of [employer]  or Ricki Kaur   The above information is an  only   It is not intended as medical advice for individual conditions or treatments  Talk to your doctor, nurse or pharmacist before following any medical regimen to see if it is safe and effective for you  Acute Cough   AMBULATORY CARE:   An acute cough  can last up to 3 weeks  Common causes of an acute cough include a cold, allergies, or a lung infection  Seek care immediately if:   · You have trouble breathing or feel short of breath  · You cough up blood, or you see blood in your mucus  · You faint or feel weak or dizzy  · You have chest pain when you cough or take a deep breath  · You have new wheezing  Contact your healthcare provider if:   · You have a fever  · Your cough lasts longer than 4 weeks  · Your symptoms do not improve with treatment  · You have questions or concerns about your condition or care  Treatment:  An acute cough usually goes away on its own  Ask your healthcare provider about medicines you can take to decrease your cough  You may need medicine to stop the cough, decrease swelling in your airways, or help open your airways  Medicine may also be given to help you cough up mucus  If you have an infection caused by bacteria, you may need antibiotics  Manage your symptoms:   · Do not smoke and stay away from others who smoke  Nicotine and other chemicals in cigarettes and cigars can cause lung damage and make your cough worse  Ask your healthcare provider for information if you currently smoke and need help to quit  E-cigarettes or smokeless tobacco still contain nicotine  Talk to your healthcare provider before you use these products  · Drink extra liquids as directed  Liquids will help thin and loosen mucus so you can cough it up  Liquids will also help prevent dehydration  Examples of good liquids to drink include water, fruit juice, and broth  Do not drink liquids that contain caffeine  Caffeine can increase your risk for dehydration   Ask your healthcare provider how much liquid to drink each day  · Rest as directed  Do not do activities that make your cough worse, such as exercise  · Use a humidifier or vaporizer  Use a cool mist humidifier or a vaporizer to increase air moisture in your home  This may make it easier for you to breathe and help decrease your cough  · Eat 2 to 5 mL of honey 2 times each day  Honey can help thin mucus and decrease your cough  · Use cough drops or lozenges  These can help decrease throat irritation and your cough  Follow up with your healthcare provider as directed:  Write down your questions so you remember to ask them during your visits  © Copyright [employer] 2022 Information is for End User's use only and may not be sold, redistributed or otherwise used for commercial purposes  All illustrations and images included in CareNotes® are the copyrighted property of [employer]  or Ricki Kaur   The above information is an  only  It is not intended as medical advice for individual conditions or treatments  Talk to your doctor, nurse or pharmacist before following any medical regimen to see if it is safe and effective for you

## 2022-10-26 ENCOUNTER — OFFICE VISIT (OUTPATIENT)
Dept: OBGYN CLINIC | Facility: CLINIC | Age: 32
End: 2022-10-26
Payer: COMMERCIAL

## 2022-10-26 VITALS
HEART RATE: 104 BPM | DIASTOLIC BLOOD PRESSURE: 82 MMHG | OXYGEN SATURATION: 99 % | TEMPERATURE: 96.7 F | HEIGHT: 67 IN | SYSTOLIC BLOOD PRESSURE: 124 MMHG | BODY MASS INDEX: 41 KG/M2 | WEIGHT: 261.2 LBS

## 2022-10-26 DIAGNOSIS — N92.6 IRREGULAR MENSES: ICD-10-CM

## 2022-10-26 DIAGNOSIS — Z12.4 SCREENING FOR CERVICAL CANCER: ICD-10-CM

## 2022-10-26 DIAGNOSIS — Z01.419 ENCOUNTER FOR ANNUAL ROUTINE GYNECOLOGICAL EXAMINATION: Primary | ICD-10-CM

## 2022-10-26 PROCEDURE — G0476 HPV COMBO ASSAY CA SCREEN: HCPCS | Performed by: OBSTETRICS & GYNECOLOGY

## 2022-10-26 PROCEDURE — 99385 PREV VISIT NEW AGE 18-39: CPT | Performed by: OBSTETRICS & GYNECOLOGY

## 2022-10-26 RX ORDER — LEVONORGESTREL AND ETHINYL ESTRADIOL 0.15-0.03
1 KIT ORAL DAILY
Qty: 84 TABLET | Refills: 4 | Status: SHIPPED | OUTPATIENT
Start: 2022-10-26

## 2022-10-26 NOTE — ASSESSMENT & PLAN NOTE
- Discussed ACOG guidelines for pap smear screening frequency: performed today  - Discussed healthy lifestyle recommendations for diet, exercise and self breast awareness   - Discussed ACOG recommendations for screening mammograms:not indicated today  - Discussed age based recommendations for adequate calcium and vitamin D intake  No additional osteoporosis screening indicated at this time  - Discussed ACOG recommendations for colon cancer screening: not indicated at this time  - Safe sex practices were discussed and STI testing was not desired by the patient  - Contraceptive options were reviewed: see discussion regarding OCP  - Routine follow up in 1 year was recommended or sooner as needed  All questions and concerns were addressed

## 2022-10-26 NOTE — PATIENT INSTRUCTIONS
[facility]: for up to date hours, call [phone])  [facility]/lab     Try and take your birth control pill around the same time daily  Setting an alarm on your phone can be helpful for this  A pill is not considered late until it is 24 hours beyond when you would normally take it  - If you miss one pill, take it as soon as you remember and continue the rest of the pack  - If you miss two pills in a row, you should take them as soon as you remember and use condoms as back up contraception for the rest of the pack  - If you miss two pills in the FIRST WEEK of the pack, you should stop taking the pills, consider taking emergency contraception (Plan B) if you have had intercourse during this time, and use condoms until your next period begins again at which point you can resume taking the birth control pill from a new pack  This is also the case if you ever miss three or more pills in a row  Whenever you are resuming birth control pills (going back on the pill after switching from another method or from no method), you need to use condoms for at least the first 7 days  Birth control pills do NOT protect against sexually transmitted infections - only consistent condom use does  Common symptoms while taking birth control pills include:   - nausea, breast tenderness, headaches: usually resolve within first few months of use  - unscheduled bleeding (AKA breakthrough bleeding): often improves within first few months of use  - lack of any bleeding: this is normal for some women on the pill  It does not mean that the pill is not protecting against pregnancy, as long as you are taking your pills consistently/correctly  Birth control pills are NOT consistently associated with:  - weight gain, mood changes, change in libido, infertility  - they do NOT increase the risk of breast, ovarian, or uterine cancer       Birth control pills are % effective at preventing pregnancy - no method is! With perfect use the failure rate is ~ 0 3%  With typical use, failure rate is closer to 7%

## 2022-10-26 NOTE — ASSESSMENT & PLAN NOTE
Irregular menses since menarche, other ROS noncontributory  Denies any previous workup for irregular menses - reviewed potential etiologies, labs sent, advised obtaining prior to OCP's  Discussed benefit of OCP for endometrial protection, pt also desires contraception  She has no migraines with aura  She denies a personal or family history of stroke, DVT, PE or MI  She is a nonsmoker  She denies a history of uncontrolled high blood pressure  She denies a history of liver disease      Extended cycle OCP Silvio sent, reviewed instructions for use, need for condom use until starting and for at least first 7 days afterwards   Will f/u based on results

## 2022-10-26 NOTE — PROGRESS NOTES
Lilly England is a 33 y o  female who presents for annual exam       Chief Complaint   Patient presents with   • Establish Care     New pt   • Gynecologic Exam     No PAP on record; pt states last PAP was 7-8 yrs ago, no concerns     New GYN - last 6-7 years ago  Moved to area recently, previously had to travel a lot for work   H/o irregular menses since menarche  1-3 months apart, usually 4-8 weeks apart  OCP regulated in the past  +cramping with her menses - happens every other cycle  Pain sometimes interferes with ability to work  Denies similar pain outside of menses  Menses with moderate flow, changing tampon every 4 hrs, lasting 3-4 days total  Denies excessive hair growth/acne     Wants to restart OCP, previously on extended cycle OCP  These were effective in regulating bleeding    No issues taking pills daily  No BRB except for first few weeks after starting OCP's   Currently SA, using condoms   Getting  in 2 weeks   Plans on trying to quit smoking next year   Probably does not want to have children ever       Last Pap: 10/26/2022      HPV vaccine completed:yes -  Current contraception: condoms  History of abnormal Pap smear: no  History of abnormal mammogram: no      Family history of uterine or ovarian cancer: no  Family history of breast cancer: yes - paternal GM, dx 66's   Family history of colon cancer: no      Menstrual History:  OB History        0    Para   0    Term   0       0    AB   0    Living   0       SAB   0    IAB   0    Ectopic   0    Multiple   0    Live Births   0                Menarche Age: 14 years   Patient's last menstrual period was 10/14/2022 (exact date)    Period Duration (Days): 4  Period Pattern: (!) Irregular  Menstrual Flow: Moderate  Menstrual Control: Tampon  Dysmenorrhea: (!) Moderate  Dysmenorrhea Symptoms: Cramping      Past Medical History:   Diagnosis Date   • Allergy-induced asthma    • Menstrual migraine     no aura   • Seasonal allergies      Past Surgical History:   Procedure Laterality Date   • FOOT SURGERY Right     hardware inserted and removed after her body rejected it   • MOUTH SURGERY     • TONSILLECTOMY AND ADENOIDECTOMY       Family History   Problem Relation Age of Onset   • Cancer Father         Pancreatic Cancer   • Breast cancer Paternal Grandmother    • Colon cancer Neg Hx    • Ovarian cancer Neg Hx    • Deep vein thrombosis Neg Hx    • Pulmonary embolism Neg Hx        Social History     Tobacco Use   • Smoking status: Light Tobacco Smoker     Packs/day: 1 00     Years: 1 00     Pack years: 1 00     Types: Cigars   • Smokeless tobacco: Never Used   • Tobacco comment: I do currently vape   Vaping Use   • Vaping Use: Every day   • Substances: Nicotine, Flavoring   Substance Use Topics   • Alcohol use: Yes     Comment: Not a weekly drinker   • Drug use: Not Currently     Types: Marijuana     Comment: Once in awhile          Current Outpatient Medications:   •  albuterol (Proventil HFA) 90 mcg/act inhaler, Inhale 2 puffs every 6 (six) hours as needed for wheezing, Disp: 6 7 g, Rfl: 0  •  levonorgestrel-ethinyl estradiol (Jolessa) 0 15-0 03 MG per tablet, Take 1 tablet by mouth daily, Disp: 84 tablet, Rfl: 4    Allergies   Allergen Reactions   • Peanut-Containing Drug Products - Food Allergy Anaphylaxis     Pt states its dormant but severe if it becomes active           Review of Systems   Constitutional: Negative for appetite change, chills and fever  Eyes: Negative for visual disturbance  Respiratory: Negative for cough, chest tightness and shortness of breath  Cardiovascular: Negative for chest pain  Gastrointestinal: Negative for abdominal distention, abdominal pain, constipation, diarrhea, nausea and vomiting  Endocrine: Negative for cold intolerance and heat intolerance  Genitourinary: Positive for menstrual problem   Negative for difficulty urinating, dyspareunia, dysuria, frequency, genital sores, pelvic pain, urgency, vaginal bleeding, vaginal discharge and vaginal pain  Musculoskeletal: Negative for arthralgias  Neurological: Negative for light-headedness and headaches  Hematological: Does not bruise/bleed easily  Psychiatric/Behavioral: Negative for behavioral problems  All other systems reviewed and are negative  /82 (BP Location: Right arm, Patient Position: Sitting, Cuff Size: Large)   Pulse 104   Temp (!) 96 7 °F (35 9 °C) (Tympanic)   Ht 5' 7" (1 702 m)   Wt 118 kg (261 lb 3 2 oz)   LMP 10/14/2022 (Exact Date)   SpO2 99%   Breastfeeding No   BMI 40 91 kg/m²         Physical Exam  Constitutional:       General: She is not in acute distress  Appearance: Normal appearance  She is obese  Genitourinary:      Vulva, bladder and urethral meatus normal       No lesions in the vagina  Right Labia: No rash, tenderness, lesions or skin changes  Left Labia: No tenderness, lesions, skin changes or rash  No labial fusion noted  No inguinal adenopathy present in the right or left side  No vaginal discharge, erythema, tenderness, bleeding or ulceration  No vaginal prolapse present  Right Adnexa: not tender, not full and no mass present  Left Adnexa: not tender, not full and no mass present  Adnexa exam comments: Limited by habitus   No cervical motion tenderness, discharge, friability, lesion or polyp  Uterus is not enlarged, fixed, tender or irregular  No uterine mass detected  Uterus exam comments: Limited by habitus   Uterus is anteverted  Pelvic exam was performed with patient in the lithotomy position  Breasts:      Right: No swelling, bleeding, inverted nipple, mass, nipple discharge, skin change, tenderness, axillary adenopathy or supraclavicular adenopathy  Left: No swelling, bleeding, inverted nipple, mass, nipple discharge, skin change, tenderness, axillary adenopathy or supraclavicular adenopathy         HENT: Head: Normocephalic and atraumatic  Neck:      Thyroid: No thyromegaly  Cardiovascular:      Rate and Rhythm: Normal rate and regular rhythm  Pulmonary:      Effort: Pulmonary effort is normal  No accessory muscle usage or respiratory distress  Abdominal:      General: There is no distension  Palpations: Abdomen is soft  Tenderness: There is no abdominal tenderness  There is no guarding or rebound  Musculoskeletal:         General: Normal range of motion  Cervical back: Normal range of motion and neck supple  Lymphadenopathy:      Upper Body:      Right upper body: No supraclavicular or axillary adenopathy  Left upper body: No supraclavicular or axillary adenopathy  Lower Body: No right inguinal and no right inguinal adenopathy  No left inguinal and no left inguinal adenopathy  Neurological:      General: No focal deficit present  Mental Status: She is alert  Skin:     General: Skin is warm and dry  Findings: No erythema  Psychiatric:         Mood and Affect: Mood normal          Behavior: Behavior normal    Vitals and nursing note reviewed  Exam conducted with a chaperone present  Encounter for annual routine gynecological examination  - Discussed ACOG guidelines for pap smear screening frequency: performed today  - Discussed healthy lifestyle recommendations for diet, exercise and self breast awareness   - Discussed ACOG recommendations for screening mammograms:not indicated today  - Discussed age based recommendations for adequate calcium and vitamin D intake  No additional osteoporosis screening indicated at this time  - Discussed ACOG recommendations for colon cancer screening: not indicated at this time  - Safe sex practices were discussed and STI testing was not desired by the patient  - Contraceptive options were reviewed: see discussion regarding OCP  - Routine follow up in 1 year was recommended or sooner as needed   All questions and concerns were addressed  Irregular menses  Irregular menses since menarche, other ROS noncontributory  Denies any previous workup for irregular menses - reviewed potential etiologies, labs sent, advised obtaining prior to OCP's  Discussed benefit of OCP for endometrial protection, pt also desires contraception  She has no migraines with aura  She denies a personal or family history of stroke, DVT, PE or MI  She is a nonsmoker  She denies a history of uncontrolled high blood pressure  She denies a history of liver disease      Extended cycle OCP Silvio sent, reviewed instructions for use, need for condom use until starting and for at least first 7 days afterwards   Will f/u based on results

## 2022-10-27 LAB
HPV HR 12 DNA CVX QL NAA+PROBE: NEGATIVE
HPV16 DNA CVX QL NAA+PROBE: NEGATIVE
HPV18 DNA CVX QL NAA+PROBE: NEGATIVE

## 2022-11-04 LAB
LAB AP GYN PRIMARY INTERPRETATION: NORMAL
Lab: NORMAL